# Patient Record
Sex: FEMALE | Race: WHITE | NOT HISPANIC OR LATINO | Employment: FULL TIME | URBAN - METROPOLITAN AREA
[De-identification: names, ages, dates, MRNs, and addresses within clinical notes are randomized per-mention and may not be internally consistent; named-entity substitution may affect disease eponyms.]

---

## 2023-03-03 ENCOUNTER — HOSPITAL ENCOUNTER (EMERGENCY)
Facility: HOSPITAL | Age: 53
Discharge: HOME | End: 2023-03-04
Attending: EMERGENCY MEDICINE

## 2023-03-03 ENCOUNTER — APPOINTMENT (EMERGENCY)
Dept: RADIOLOGY | Facility: HOSPITAL | Age: 53
End: 2023-03-03

## 2023-03-03 VITALS
HEART RATE: 60 BPM | SYSTOLIC BLOOD PRESSURE: 130 MMHG | RESPIRATION RATE: 18 BRPM | TEMPERATURE: 97.8 F | OXYGEN SATURATION: 97 % | DIASTOLIC BLOOD PRESSURE: 60 MMHG

## 2023-03-03 DIAGNOSIS — S42.201A CLOSED FRACTURE OF RIGHT PROXIMAL HUMERUS: Primary | ICD-10-CM

## 2023-03-03 RX ORDER — HYDROMORPHONE HCL/PF 1 MG/ML
0.5 SYRINGE (ML) INJECTION ONCE
Status: COMPLETED | OUTPATIENT
Start: 2023-03-03 | End: 2023-03-03

## 2023-03-03 RX ORDER — OXYCODONE HYDROCHLORIDE 5 MG/1
5 TABLET ORAL EVERY 4 HOURS PRN
Qty: 15 TABLET | Refills: 0 | Status: SHIPPED | OUTPATIENT
Start: 2023-03-03

## 2023-03-03 RX ORDER — FENTANYL CITRATE 50 UG/ML
2 INJECTION, SOLUTION INTRAMUSCULAR; INTRAVENOUS ONCE
Status: COMPLETED | OUTPATIENT
Start: 2023-03-03 | End: 2023-03-03

## 2023-03-03 RX ADMIN — HYDROMORPHONE HYDROCHLORIDE 0.5 MG: 1 INJECTION, SOLUTION INTRAMUSCULAR; INTRAVENOUS; SUBCUTANEOUS at 23:18

## 2023-03-03 RX ADMIN — HYDROMORPHONE HYDROCHLORIDE 0.5 MG: 1 INJECTION, SOLUTION INTRAMUSCULAR; INTRAVENOUS; SUBCUTANEOUS at 21:59

## 2023-03-04 NOTE — ED PROVIDER NOTES
History  Chief Complaint   Patient presents with   • Shoulder Injury     Elisa Monelly at [facility], deformity/pain noted to R shoulder     HPI  Patient is a 36-year-old female presenting with a right shoulder injury  Patient other than due to breast cancer having had right-sided mastectomy has no other medical conditions  Patient states that she had a mechanical fall while she was entering the [facility] and she fell on her right shoulder  Since then she has been having excruciating pain in the right shoulder  Denies any elbow pain and does not have any wrist pain  Patient denies any numbness nor does she have any weakness distal to the injured site  Denies any head injury and denies loss of consciousness  None       History reviewed  No pertinent past medical history  Past Surgical History:   Procedure Laterality Date   • MASTECTOMY Right        History reviewed  No pertinent family history  I have reviewed and agree with the history as documented  E-Cigarette/Vaping     E-Cigarette/Vaping Substances           Review of Systems   Constitutional: Negative for chills, diaphoresis, fever and unexpected weight change  HENT: Negative for ear pain and sore throat  Eyes: Negative for visual disturbance  Respiratory: Negative for cough, chest tightness and shortness of breath  Cardiovascular: Negative for chest pain and leg swelling  Gastrointestinal: Negative for abdominal distention, abdominal pain, constipation, diarrhea, nausea and vomiting  Endocrine: Negative  Genitourinary: Negative for difficulty urinating and dysuria  Musculoskeletal: Positive for arthralgias  Skin: Negative  Allergic/Immunologic: Negative  Neurological: Negative  Negative for weakness and numbness  Hematological: Negative  Psychiatric/Behavioral: Negative  All other systems reviewed and are negative        Physical Exam  ED Triage Vitals   Temperature Pulse Respirations Blood Pressure SpO2   03/03/23 2201 03/03/23 2201 03/03/23 2201 03/03/23 2201 03/03/23 2201   97 8 °F (36 6 °C) 60 18 130/60 97 %      Temp Source Heart Rate Source Patient Position - Orthostatic VS BP Location FiO2 (%)   03/03/23 2201 -- -- -- --   Oral          Pain Score       03/03/23 2159       8             Orthostatic Vital Signs  Vitals:    03/03/23 2201   BP: 130/60   Pulse: 60       Physical Exam  Vitals and nursing note reviewed  Constitutional:       General: She is not in acute distress  Appearance: Normal appearance  She is not ill-appearing  HENT:      Head: Normocephalic and atraumatic  Right Ear: External ear normal       Left Ear: External ear normal       Nose: Nose normal       Mouth/Throat:      Mouth: Mucous membranes are moist       Pharynx: Oropharynx is clear  Eyes:      General: No scleral icterus  Right eye: No discharge  Left eye: No discharge  Extraocular Movements: Extraocular movements intact  Conjunctiva/sclera: Conjunctivae normal       Pupils: Pupils are equal, round, and reactive to light  Cardiovascular:      Rate and Rhythm: Normal rate and regular rhythm  Pulses: Normal pulses  Heart sounds: Normal heart sounds  Pulmonary:      Effort: Pulmonary effort is normal       Breath sounds: Normal breath sounds  Abdominal:      General: Abdomen is flat  Bowel sounds are normal  There is no distension  Palpations: Abdomen is soft  Tenderness: There is no abdominal tenderness  There is no guarding or rebound  Musculoskeletal:         General: Tenderness (Right shoulder pain) and deformity (Right shoulder squared kept in an abducted position) present  Normal range of motion  Cervical back: Normal range of motion and neck supple  Skin:     General: Skin is warm and dry  Capillary Refill: Capillary refill takes less than 2 seconds  Neurological:      General: No focal deficit present        Mental Status: She is alert and oriented to person, place, and time  Mental status is at baseline  Psychiatric:         Mood and Affect: Mood normal          Behavior: Behavior normal          Thought Content: Thought content normal          Judgment: Judgment normal          ED Medications  Medications   fentanyl citrate (PF) (FOR EMS ONLY) 100 mcg/2 mL injection 200 mcg (0 mcg Does not apply Given to EMS 3/3/23 2121)   HYDROmorphone (DILAUDID) injection 0 5 mg (0 5 mg Intravenous Given 3/3/23 2159)   HYDROmorphone (DILAUDID) injection 0 5 mg (0 5 mg Intravenous Given 3/3/23 2318)   HYDROmorphone (DILAUDID) injection 0 5 mg (0 5 mg Intravenous Given 3/3/23 2318)       Diagnostic Studies  Results Reviewed     None                 XR shoulder 2+ views RIGHT    (Results Pending)         Procedures  Procedures      ED Course                                       Medical Decision Making   Patient is a 50-year-old female presenting with right proximal humerus closed fracture  Confirmed by x-ray  No signs of dislocation noted  Patient with no other complaints besides the right shoulder pain  Analgesia provided  Sling placed  Prescription for oxycodone given and instructed follow-up with orthopedics    Closed fracture of right proximal humerus: acute illness or injury  Amount and/or Complexity of Data Reviewed  Radiology: ordered  Risk  Prescription drug management              Disposition  Final diagnoses:   Closed fracture of right proximal humerus     Time reflects when diagnosis was documented in both MDM as applicable and the Disposition within this note     Time User Action Codes Description Comment    3/3/2023 11:23 PM Jacey Guadarrama Add [S42 309A] Humerus fracture     3/3/2023 11:38 PM Nesarbjit Nieto Humerus fracture     3/3/2023 11:39 PM Allyson Olson Add [[id number]] Closed fracture of right proximal humerus       ED Disposition     ED Disposition   Discharge    Condition   Stable    Date/Time   Fri Mar 3, 2023 11:23 PM    Comment   Td Rucker Rudolph Snell discharge to home/self care  Follow-up Information     Follow up With Specialties Details Why Contact Info Additional [address] [facility] Orthopedic Surgery Schedule an appointment as soon as possible for a visit   [address] [facility] [facility], [address] [place], [place], [address] [facility]  Use Entrance A     [address] Emergency Department Emergency Medicine Go to  If symptoms worsen [address] Emergency Department, [address], [place], [address]          Discharge Medication List as of 3/3/2023 11:48 PM      START taking these medications    Details   oxyCODONE (Roxicodone) 5 immediate release tablet Take 1 tablet (5 mg total) by mouth every 4 (four) hours as needed for moderate pain for up to 15 doses Max Daily Amount: 30 mg, Starting Fri 3/3/2023, Normal           No discharge procedures on file  PDMP Review     None           ED Provider  Attending physically available and evaluated Rocio Said  I managed the patient along with the ED Attending      Electronically Signed by         Nasir Alicea MD  03/04/23 2009

## 2023-03-04 NOTE — ED ATTENDING ATTESTATION
3/3/2023  I, Verdia Cabot, DO, saw and evaluated the patient  I have discussed the patient with the resident/non-physician practitioner and agree with the resident's/non-physician practitioner's findings, Plan of Care, and MDM as documented in the resident's/non-physician practitioner's note, except where noted  All available labs and Radiology studies were reviewed  I was present for key portions of any procedure(s) performed by the resident/non-physician practitioner and I was immediately available to provide assistance  At this point I agree with the current assessment done in the Emergency Department  I have conducted an independent evaluation of this patient a history and physical is as follows:    47 y o  female presents for fall  Right shoulder pain  Mechanical fall  At [facility]  No head injury  Deformity noted      Exam:  Vitals reviewed wnl  Right shoulder deformity  RUM intact, pulses intact  Sensation intact        Impression:right shoulder injury      Differential diagnosis: fracture, dislocation of shoulder  Clavicle fracture      Plan: xray r/o fracture  Pain control                External records independently reviewed by me    History obtained from patient  Has no social barriers to care                      ED Course         Critical Care Time  Procedures